# Patient Record
Sex: MALE | Race: WHITE | NOT HISPANIC OR LATINO | URBAN - METROPOLITAN AREA
[De-identification: names, ages, dates, MRNs, and addresses within clinical notes are randomized per-mention and may not be internally consistent; named-entity substitution may affect disease eponyms.]

---

## 2018-04-24 ENCOUNTER — LAB VISIT (OUTPATIENT)
Dept: LAB | Facility: HOSPITAL | Age: 68
End: 2018-04-24
Attending: FAMILY MEDICINE
Payer: MEDICARE

## 2018-04-24 ENCOUNTER — OFFICE VISIT (OUTPATIENT)
Dept: FAMILY MEDICINE | Facility: CLINIC | Age: 68
End: 2018-04-24
Payer: MEDICARE

## 2018-04-24 VITALS
TEMPERATURE: 98 F | HEIGHT: 60 IN | BODY MASS INDEX: 27.88 KG/M2 | WEIGHT: 142 LBS | DIASTOLIC BLOOD PRESSURE: 89 MMHG | RESPIRATION RATE: 18 BRPM | OXYGEN SATURATION: 98 % | HEART RATE: 81 BPM | SYSTOLIC BLOOD PRESSURE: 131 MMHG

## 2018-04-24 DIAGNOSIS — F41.9 ANXIETY: ICD-10-CM

## 2018-04-24 DIAGNOSIS — E53.8 B12 DEFICIENCY: ICD-10-CM

## 2018-04-24 DIAGNOSIS — R41.3 MEMORY LOSS: Primary | ICD-10-CM

## 2018-04-24 DIAGNOSIS — R41.3 MEMORY LOSS: ICD-10-CM

## 2018-04-24 LAB
ALBUMIN SERPL BCP-MCNC: 3.8 G/DL
ALP SERPL-CCNC: 109 U/L
ALT SERPL W/O P-5'-P-CCNC: 12 U/L
ANION GAP SERPL CALC-SCNC: 10 MMOL/L
AST SERPL-CCNC: 18 U/L
BASOPHILS # BLD AUTO: 0.07 K/UL
BASOPHILS NFR BLD: 0.7 %
BILIRUB SERPL-MCNC: 0.3 MG/DL
BUN SERPL-MCNC: 12 MG/DL
CALCIUM SERPL-MCNC: 9 MG/DL
CHLORIDE SERPL-SCNC: 108 MMOL/L
CO2 SERPL-SCNC: 25 MMOL/L
CREAT SERPL-MCNC: 1 MG/DL
DIFFERENTIAL METHOD: ABNORMAL
EOSINOPHIL # BLD AUTO: 0.2 K/UL
EOSINOPHIL NFR BLD: 2.5 %
ERYTHROCYTE [DISTWIDTH] IN BLOOD BY AUTOMATED COUNT: 14.6 %
EST. GFR  (AFRICAN AMERICAN): >60 ML/MIN/1.73 M^2
EST. GFR  (NON AFRICAN AMERICAN): >60 ML/MIN/1.73 M^2
GLUCOSE SERPL-MCNC: 91 MG/DL
HCT VFR BLD AUTO: 41.6 %
HGB BLD-MCNC: 13.4 G/DL
IMM GRANULOCYTES # BLD AUTO: 0.07 K/UL
IMM GRANULOCYTES NFR BLD AUTO: 0.7 %
LYMPHOCYTES # BLD AUTO: 2.7 K/UL
LYMPHOCYTES NFR BLD: 29 %
MCH RBC QN AUTO: 28.8 PG
MCHC RBC AUTO-ENTMCNC: 32.2 G/DL
MCV RBC AUTO: 89 FL
MONOCYTES # BLD AUTO: 0.5 K/UL
MONOCYTES NFR BLD: 5.6 %
NEUTROPHILS # BLD AUTO: 5.8 K/UL
NEUTROPHILS NFR BLD: 61.5 %
NRBC BLD-RTO: 0 /100 WBC
PLATELET # BLD AUTO: 312 K/UL
PMV BLD AUTO: 9.3 FL
POTASSIUM SERPL-SCNC: 4.3 MMOL/L
PROT SERPL-MCNC: 7.2 G/DL
RBC # BLD AUTO: 4.66 M/UL
SODIUM SERPL-SCNC: 143 MMOL/L
VIT B12 SERPL-MCNC: 629 PG/ML
WBC # BLD AUTO: 9.43 K/UL

## 2018-04-24 PROCEDURE — 80053 COMPREHEN METABOLIC PANEL: CPT

## 2018-04-24 PROCEDURE — 82607 VITAMIN B-12: CPT

## 2018-04-24 PROCEDURE — 85025 COMPLETE CBC W/AUTO DIFF WBC: CPT

## 2018-04-24 PROCEDURE — 99214 OFFICE O/P EST MOD 30 MIN: CPT | Mod: S$GLB,,, | Performed by: FAMILY MEDICINE

## 2018-04-24 PROCEDURE — 36415 COLL VENOUS BLD VENIPUNCTURE: CPT

## 2018-04-24 RX ORDER — LANOLIN ALCOHOL/MO/W.PET/CERES
CREAM (GRAM) TOPICAL
COMMUNITY

## 2018-04-24 RX ORDER — FLUOXETINE 10 MG/1
10 TABLET ORAL DAILY
Qty: 30 TABLET | Refills: 2 | Status: SHIPPED | OUTPATIENT
Start: 2018-04-24 | End: 2019-04-24

## 2018-04-26 PROBLEM — F41.9 ANXIETY: Chronic | Status: ACTIVE | Noted: 2018-04-26

## 2018-04-26 PROBLEM — R41.3 MEMORY LOSS: Status: ACTIVE | Noted: 2018-04-26

## 2018-04-26 NOTE — PROGRESS NOTES
"Subjective:       Patient ID: Sarmad Belle is a 68 y.o. male.    Chief Complaint: Follow-up    HPI   Mr. Belle presents for follow-up. No longer taking the escitalopram that was previously started for anxiety. His caretaker reports this caused him to have nightmares, so he stopped it on his own. Sees neurology for management of his memory loss and is currently on Namzaric for this. Next appointment with neuro is in 4-6 weeks. He was told he needs labs prior to this appointment. Has a history of B12 deficiency but no longer is on injections.    Review of Systems   Constitutional: Negative for activity change, appetite change, fatigue and fever.   Cardiovascular: Negative for chest pain, palpitations and leg swelling.   Skin: Negative for color change, pallor and wound.   Psychiatric/Behavioral: Positive for sleep disturbance. Negative for behavioral problems and dysphoric mood. The patient is nervous/anxious.        Past Medical History:   Diagnosis Date    Anxiety     Dementia     Depression      Past Surgical History:   Procedure Laterality Date    BRAIN SURGERY       Social History     Social History    Marital status: Single     Spouse name: N/A    Number of children: N/A    Years of education: N/A     Occupational History    Not on file.     Social History Main Topics    Smoking status: Never Smoker    Smokeless tobacco: Never Used    Alcohol use No    Drug use: No    Sexual activity: No     Other Topics Concern    Not on file     Social History Narrative    No narrative on file     Family History   Problem Relation Age of Onset    Hypertension Mother     COPD Father     Hypertension Brother        Objective:      /89 (BP Location: Left arm, Patient Position: Sitting)   Pulse 81   Temp 97.9 °F (36.6 °C) (Tympanic)   Resp 18   Ht 4' 11" (1.499 m)   Wt 64.4 kg (142 lb)   SpO2 98%   BMI 28.68 kg/m²   Physical Exam   Constitutional: He appears well-developed and well-nourished. No " distress.   Eyes: Conjunctivae and EOM are normal. Pupils are equal, round, and reactive to light. No scleral icterus.   Cardiovascular: Normal rate, regular rhythm and normal heart sounds.    No murmur heard.  Pulmonary/Chest: Effort normal and breath sounds normal. No respiratory distress. He has no wheezes.   Skin: He is not diaphoretic.   Vitals reviewed.      Assessment:       1. Memory loss    2. B12 deficiency    3. Anxiety        Plan:       Memory loss  - forward results to neuro (Dr. Art) when available   -     CBC auto differential; Future; Expected date: 04/24/2018  -     Comprehensive metabolic panel; Future; Expected date: 04/24/2018    B12 deficiency  -     CBC auto differential; Future; Expected date: 04/24/2018  -     Comprehensive metabolic panel; Future; Expected date: 04/24/2018  -     Vitamin B12; Future; Expected date: 04/24/2018    Anxiety  -     Patient stopped escitalopram; try FLUoxetine 10 MG Tab; Take 1 tablet (10 mg total) by mouth once daily.  Dispense: 30 tablet; Refill: 2  - see back in 6-8 weeks            Risks, benefits, and side effects were discussed with the patient. All questions were answered to the fullest satisfaction of the patient, and pt verbalized understanding and agreement to treatment plan. Pt was to call with any new or worsening symptoms, or present to the ER.

## 2018-05-17 ENCOUNTER — TELEPHONE (OUTPATIENT)
Dept: FAMILY MEDICINE | Facility: CLINIC | Age: 68
End: 2018-05-17

## 2018-05-17 NOTE — TELEPHONE ENCOUNTER
----- Message from Catie Burnett sent at 5/17/2018  2:42 PM CDT -----  Type: Needs Medical Advice    Who Called: brother Mckee  Best Call Back Number: [phone]  Additional Information: Calling about the requesst to get the patient into [facility]. They were in the office yesterday. Please advise. Thanks.

## 2018-05-21 ENCOUNTER — HOSPITAL ENCOUNTER (OUTPATIENT)
Dept: RADIOLOGY | Facility: HOSPITAL | Age: 68
Discharge: HOME | End: 2018-05-21
Attending: FAMILY MEDICINE
Payer: MEDICARE

## 2018-05-21 ENCOUNTER — OFFICE VISIT (OUTPATIENT)
Dept: FAMILY MEDICINE | Facility: CLINIC | Age: 68
End: 2018-05-21
Payer: MEDICARE

## 2018-05-21 VITALS
HEART RATE: 83 BPM | SYSTOLIC BLOOD PRESSURE: 118 MMHG | RESPIRATION RATE: 18 BRPM | OXYGEN SATURATION: 98 % | DIASTOLIC BLOOD PRESSURE: 81 MMHG

## 2018-05-21 DIAGNOSIS — Z02.2 ENCOUNTER FOR EXAMINATION FOR ADMISSION TO NURSING HOME: ICD-10-CM

## 2018-05-21 DIAGNOSIS — Z02.2 ENCOUNTER FOR EXAMINATION FOR ADMISSION TO NURSING HOME: Primary | ICD-10-CM

## 2018-05-21 PROCEDURE — 71046 X-RAY EXAM CHEST 2 VIEWS: CPT | Mod: TC,FY

## 2018-05-21 PROCEDURE — 99213 OFFICE O/P EST LOW 20 MIN: CPT | Mod: S$GLB,,, | Performed by: FAMILY MEDICINE

## 2018-05-21 PROCEDURE — 71046 X-RAY EXAM CHEST 2 VIEWS: CPT | Mod: 26,,, | Performed by: RADIOLOGY

## 2018-05-21 NOTE — PROGRESS NOTES
Subjective:       Patient ID: Sarmad Belle is a 68 y.o. male.    Chief Complaint: Follow-up (paperwork for [facility])    HPI   Mr. Belle presents for follow-up. His brother has paperwork needing to be filled out for Sarmad to be accepted to [facility]. Medical and surgical histories reviewed. Medication regimen reviewed. No significant changes since his last appointment with me.    Review of Systems   Constitutional: Negative for chills, fatigue, fever and unexpected weight change.   Respiratory: Negative for cough, chest tightness, shortness of breath and wheezing.    Cardiovascular: Negative for chest pain, palpitations and leg swelling.       Past Medical History:   Diagnosis Date    Anxiety     Dementia     Depression      Past Surgical History:   Procedure Laterality Date    BRAIN SURGERY  08/2017    hydrocephalus     Social History     Social History    Marital status: Single     Spouse name: N/A    Number of children: N/A    Years of education: N/A     Occupational History    Not on file.     Social History Main Topics    Smoking status: Never Smoker    Smokeless tobacco: Never Used    Alcohol use No    Drug use: No    Sexual activity: No     Other Topics Concern    Not on file     Social History Narrative    No narrative on file     Family History   Problem Relation Age of Onset    Hypertension Mother     COPD Father     Hypertension Brother        Objective:      /81 (BP Location: Left arm, Patient Position: Sitting, BP Method: Large (Automatic))   Pulse 83   Resp 18   SpO2 98%   Physical Exam   Constitutional: He appears well-developed and well-nourished. No distress.   Pulmonary/Chest: Effort normal and breath sounds normal. No respiratory distress. He has no wheezes.   Skin: Skin is warm and dry. No rash noted. He is not diaphoretic.   Vitals reviewed.      Assessment:       1. Encounter for examination for admission to nursing home         Plan:       Encounter for examination for admission to nursing home  -     X-Ray Chest PA And Lateral; Future; Expected date: 05/21/2018  -     Quantiferon Gold TB; Future; Expected date: 05/21/2018            Risks, benefits, and side effects were discussed with the patient. All questions were answered to the fullest satisfaction of the patient, and pt verbalized understanding and agreement to treatment plan. Pt was to call with any new or worsening symptoms, or present to the ER.

## 2018-05-23 ENCOUNTER — TELEPHONE (OUTPATIENT)
Dept: FAMILY MEDICINE | Facility: CLINIC | Age: 68
End: 2018-05-23

## 2018-05-23 NOTE — TELEPHONE ENCOUNTER
----- Message from Vicky Mason sent at 5/23/2018  9:09 AM CDT -----  Contact: Rafi Belle (Brother)  Type: Needs Medical Advice    Who Called:  Rafi Belle (Brother)  Symptoms (please be specific):  NA  How long has patient had these symptoms:  NA  Pharmacy name and phone #:  NA  Best Call Back Number: Rafi at   Additional Information: Calling to speak with the Nurse about the paperwork that is needed to be filled out and faxed back in a timely manner. Please advise.

## 2018-05-24 ENCOUNTER — TELEPHONE (OUTPATIENT)
Dept: FAMILY MEDICINE | Facility: CLINIC | Age: 68
End: 2018-05-24

## 2018-05-24 NOTE — TELEPHONE ENCOUNTER
THALIAM for Jyalyn that we did receive the fax, and it is pending for Dr. Anguiano to sign. Informed her that I will fax it back as soon as it is completed

## 2018-05-24 NOTE — TELEPHONE ENCOUNTER
----- Message from Chiara Rogers sent at 5/24/2018 11:53 AM CDT -----  Contact: Jaylyn with [facility]  A faxed was sent over this morning and the doctor needs to write orders to admit the patient to the facility.  Jaylyn is calling to see if it was received and if the doctor had any questions.  Call Back#[phone]  Thanks

## 2018-06-26 DIAGNOSIS — Z98.2 STATUS POST VENTRICULAR SHUNT PLACEMENT: Primary | ICD-10-CM

## 2018-06-28 ENCOUNTER — HOSPITAL ENCOUNTER (OUTPATIENT)
Dept: RADIOLOGY | Facility: HOSPITAL | Age: 68
Discharge: HOME | End: 2018-06-28
Attending: NURSE PRACTITIONER
Payer: MEDICARE

## 2018-06-28 DIAGNOSIS — Z98.2 STATUS POST VENTRICULAR SHUNT PLACEMENT: ICD-10-CM

## 2018-06-28 PROCEDURE — 70450 CT HEAD/BRAIN W/O DYE: CPT | Mod: 26,,, | Performed by: RADIOLOGY

## 2018-06-28 PROCEDURE — 70450 CT HEAD/BRAIN W/O DYE: CPT | Mod: TC

## 2019-05-02 DIAGNOSIS — Z12.11 COLON CANCER SCREENING: ICD-10-CM

## 2019-05-03 DIAGNOSIS — Z11.59 NEED FOR HEPATITIS C SCREENING TEST: ICD-10-CM

## 2020-05-06 DIAGNOSIS — Z12.11 COLON CANCER SCREENING: ICD-10-CM
